# Patient Record
Sex: MALE | Race: WHITE | NOT HISPANIC OR LATINO | Employment: FULL TIME | URBAN - METROPOLITAN AREA
[De-identification: names, ages, dates, MRNs, and addresses within clinical notes are randomized per-mention and may not be internally consistent; named-entity substitution may affect disease eponyms.]

---

## 2019-04-10 ENCOUNTER — TELEPHONE (OUTPATIENT)
Dept: UROLOGY | Facility: MEDICAL CENTER | Age: 29
End: 2019-04-10

## 2019-04-16 ENCOUNTER — OFFICE VISIT (OUTPATIENT)
Dept: UROLOGY | Facility: CLINIC | Age: 29
End: 2019-04-16
Payer: COMMERCIAL

## 2019-04-16 VITALS
HEIGHT: 76 IN | DIASTOLIC BLOOD PRESSURE: 82 MMHG | SYSTOLIC BLOOD PRESSURE: 128 MMHG | WEIGHT: 226 LBS | HEART RATE: 67 BPM | BODY MASS INDEX: 27.52 KG/M2

## 2019-04-16 DIAGNOSIS — R33.9 INCOMPLETE BLADDER EMPTYING: Primary | ICD-10-CM

## 2019-04-16 LAB
POST-VOID RESIDUAL VOLUME, ML POC: 4 ML
SL AMB  POCT GLUCOSE, UA: NORMAL
SL AMB LEUKOCYTE ESTERASE,UA: NORMAL
SL AMB POCT BILIRUBIN,UA: NORMAL
SL AMB POCT BLOOD,UA: NORMAL
SL AMB POCT CLARITY,UA: CLEAR
SL AMB POCT COLOR,UA: YELLOW
SL AMB POCT KETONES,UA: NORMAL
SL AMB POCT NITRITE,UA: NORMAL
SL AMB POCT PH,UA: 5.5
SL AMB POCT SPECIFIC GRAVITY,UA: 1.01
SL AMB POCT URINE PROTEIN: NORMAL
SL AMB POCT UROBILINOGEN: NORMAL

## 2019-04-16 PROCEDURE — 99203 OFFICE O/P NEW LOW 30 MIN: CPT | Performed by: PHYSICIAN ASSISTANT

## 2019-04-16 PROCEDURE — 51798 US URINE CAPACITY MEASURE: CPT | Performed by: PHYSICIAN ASSISTANT

## 2019-04-16 PROCEDURE — 81002 URINALYSIS NONAUTO W/O SCOPE: CPT | Performed by: PHYSICIAN ASSISTANT

## 2019-05-07 ENCOUNTER — TELEPHONE (OUTPATIENT)
Dept: UROLOGY | Facility: CLINIC | Age: 29
End: 2019-05-07

## 2021-04-09 DIAGNOSIS — Z23 ENCOUNTER FOR IMMUNIZATION: ICD-10-CM

## 2021-04-10 ENCOUNTER — IMMUNIZATIONS (OUTPATIENT)
Dept: FAMILY MEDICINE CLINIC | Facility: HOSPITAL | Age: 31
End: 2021-04-10

## 2021-04-10 DIAGNOSIS — Z23 ENCOUNTER FOR IMMUNIZATION: Primary | ICD-10-CM

## 2021-04-10 PROCEDURE — 0001A SARS-COV-2 / COVID-19 MRNA VACCINE (PFIZER-BIONTECH) 30 MCG: CPT

## 2021-04-10 PROCEDURE — 91300 SARS-COV-2 / COVID-19 MRNA VACCINE (PFIZER-BIONTECH) 30 MCG: CPT

## 2021-05-01 ENCOUNTER — IMMUNIZATIONS (OUTPATIENT)
Dept: FAMILY MEDICINE CLINIC | Facility: HOSPITAL | Age: 31
End: 2021-05-01

## 2021-05-01 DIAGNOSIS — Z23 ENCOUNTER FOR IMMUNIZATION: Primary | ICD-10-CM

## 2021-05-01 PROCEDURE — 0002A SARS-COV-2 / COVID-19 MRNA VACCINE (PFIZER-BIONTECH) 30 MCG: CPT

## 2021-05-01 PROCEDURE — 91300 SARS-COV-2 / COVID-19 MRNA VACCINE (PFIZER-BIONTECH) 30 MCG: CPT

## 2021-11-02 ENCOUNTER — IMMUNIZATIONS (OUTPATIENT)
Dept: FAMILY MEDICINE CLINIC | Facility: HOSPITAL | Age: 31
End: 2021-11-02

## 2021-11-02 DIAGNOSIS — Z23 ENCOUNTER FOR IMMUNIZATION: Primary | ICD-10-CM

## 2021-11-02 PROCEDURE — 0001A COVID-19 PFIZER VACC 0.3 ML: CPT

## 2021-11-02 PROCEDURE — 91300 COVID-19 PFIZER VACC 0.3 ML: CPT

## 2024-02-26 ENCOUNTER — TELEPHONE (OUTPATIENT)
Dept: FAMILY MEDICINE CLINIC | Facility: CLINIC | Age: 34
End: 2024-02-26

## 2024-02-27 ENCOUNTER — OFFICE VISIT (OUTPATIENT)
Dept: FAMILY MEDICINE CLINIC | Facility: CLINIC | Age: 34
End: 2024-02-27
Payer: COMMERCIAL

## 2024-02-27 VITALS
OXYGEN SATURATION: 98 % | HEART RATE: 68 BPM | DIASTOLIC BLOOD PRESSURE: 86 MMHG | SYSTOLIC BLOOD PRESSURE: 120 MMHG | BODY MASS INDEX: 24.84 KG/M2 | TEMPERATURE: 97.8 F | WEIGHT: 204 LBS | HEIGHT: 76 IN

## 2024-02-27 DIAGNOSIS — G47.8 SLEEP PARALYSIS: ICD-10-CM

## 2024-02-27 DIAGNOSIS — F17.298 OTHER TOBACCO PRODUCT NICOTINE DEPENDENCE WITH OTHER NICOTINE-INDUCED DISORDER: Primary | ICD-10-CM

## 2024-02-27 DIAGNOSIS — L21.9 SEBORRHEIC DERMATITIS OF SCALP: ICD-10-CM

## 2024-02-27 PROCEDURE — 99205 OFFICE O/P NEW HI 60 MIN: CPT | Performed by: FAMILY MEDICINE

## 2024-02-27 RX ORDER — BUPROPION HYDROCHLORIDE 150 MG/1
150 TABLET, EXTENDED RELEASE ORAL 2 TIMES DAILY
Qty: 60 TABLET | Refills: 2 | Status: SHIPPED | OUTPATIENT
Start: 2024-02-27 | End: 2024-03-28

## 2024-02-27 RX ORDER — KETOCONAZOLE 20 MG/ML
1 SHAMPOO TOPICAL 2 TIMES WEEKLY
Qty: 120 ML | Refills: 6 | Status: SHIPPED | OUTPATIENT
Start: 2024-02-29

## 2024-02-27 RX ORDER — CLOBETASOL PROPIONATE 0.46 MG/ML
SOLUTION TOPICAL 2 TIMES DAILY
Qty: 50 ML | Refills: 2 | Status: SHIPPED | OUTPATIENT
Start: 2024-02-27

## 2024-02-27 NOTE — PROGRESS NOTES
Assessment/Plan:   1. Seborrheic dermatitis of scalp  Returns today.  Likely secondary to seborrheic dermatitis.  At this time, we will start treatment with clobetasol external solution as well as ketoconazole shampoo.  If needed, will consider further evaluation with dermatology.  - ketoconazole (NIZORAL) 2 % shampoo; Apply 1 Application topically 2 (two) times a week  Dispense: 120 mL; Refill: 6  - clobetasol (TEMOVATE) 0.05 % external solution; Apply topically 2 (two) times a day  Dispense: 50 mL; Refill: 2    2. Other tobacco product nicotine dependence with other nicotine-induced disorder  Reviewed patient's nicotine use.  At this time, we will start treatment with Wellbutrin  mg twice daily.  He was advised that this could also help with any anxiety symptoms that he may have.  - buPROPion (Wellbutrin SR) 150 mg 12 hr tablet; Take 1 tablet (150 mg total) by mouth 2 (two) times a day  Dispense: 60 tablet; Refill: 2    3. Sleep paralysis  Patient has had a long history of  sleep paralysis.  he has been using Benadryl for a long duration to help with his symptoms.  Will refer patient to sleep medicine for further evaluation.  Follow-up if any symptoms worsen.  - Ambulatory Referral to Sleep Medicine; Future           There are no diagnoses linked to this encounter.      Subjective:       Chief Complaint   Patient presents with    Establish Care     When under a lot of stress he has shaking that is visible    Skin Problem     Scalp --- like dandruff but worse    Nasal Congestion     Dependent on nasal spray      Patient ID: Paulino Krishna is a 33 y.o. male.    Rash  This is a chronic problem. The current episode started more than 1 year ago. The problem is unchanged. The affected locations include the scalp. The problem is mild. The rash is characterized by scaling. He was exposed to nothing. Associated symptoms include congestion. Pertinent negatives include no cough, diarrhea, fatigue, fever, shortness of  breath or sore throat. Past treatments include nothing.   Sinus Problem  This is a chronic problem. The current episode started more than 1 year ago. The problem is unchanged. There has been no fever. Associated symptoms include congestion and sinus pressure. Pertinent negatives include no chills, coughing, ear pain, headaches, shortness of breath or sore throat. Treatments tried: Mucinex.       Review of Systems   Constitutional:  Negative for activity change, chills, fatigue and fever.   HENT:  Positive for congestion and sinus pressure. Negative for ear pain and sore throat.    Eyes:  Negative for redness, itching and visual disturbance.   Respiratory:  Negative for cough and shortness of breath.    Cardiovascular:  Negative for chest pain and palpitations.   Gastrointestinal:  Negative for abdominal pain, diarrhea and nausea.   Endocrine: Negative for cold intolerance and heat intolerance.   Genitourinary:  Negative for dysuria, flank pain and frequency.   Musculoskeletal:  Negative for arthralgias, back pain, gait problem and myalgias.   Skin:  Positive for rash. Negative for color change.   Allergic/Immunologic: Negative for environmental allergies.   Neurological:  Negative for dizziness, numbness and headaches.   Psychiatric/Behavioral:  Negative for behavioral problems and sleep disturbance.        The following portions of the patient's history were reviewed and updated as appropriate : past family history, past medical history, past social history and past surgical history.    Current Outpatient Medications:     diphenhydrAMINE HCl (BENADRYL PO), Take by mouth daily as needed (Patient not taking: Reported on 2/27/2024), Disp: , Rfl:     Fexofenadine HCl (ALLEGRA PO), Take by mouth as needed (Patient not taking: Reported on 2/27/2024), Disp: , Rfl:     [facility] J[facility]NS WORT PO, Take by mouth daily (Patient not taking: Reported on 2/27/2024), Disp: , Rfl:          Objective:         Vitals:    02/27/24 0956   BP:  "120/86   BP Location: Left arm   Patient Position: Sitting   Cuff Size: Large   Pulse: 68   Temp: 97.8 °F (36.6 °C)   TempSrc: Temporal   SpO2: 98%   Weight: 92.5 kg (204 lb)   Height: 6' 4\" (1.93 m)     Physical Exam  Vitals reviewed.   Constitutional:       Appearance: He is well-developed.   HENT:      Head: Normocephalic and atraumatic.      Nose: Nose normal.      Mouth/Throat:      Pharynx: No oropharyngeal exudate.   Eyes:      General: No scleral icterus.        Right eye: No discharge.         Left eye: No discharge.      Pupils: Pupils are equal, round, and reactive to light.   Neck:      Trachea: No tracheal deviation.   Cardiovascular:      Rate and Rhythm: Normal rate and regular rhythm.      Pulses:           Dorsalis pedis pulses are 2+ on the right side and 2+ on the left side.        Posterior tibial pulses are 2+ on the right side and 2+ on the left side.      Heart sounds: Normal heart sounds. No murmur heard.     No friction rub. No gallop.   Pulmonary:      Effort: Pulmonary effort is normal. No respiratory distress.      Breath sounds: Normal breath sounds. No wheezing or rales.   Abdominal:      General: Bowel sounds are normal. There is no distension.      Palpations: Abdomen is soft.      Tenderness: There is no abdominal tenderness. There is no guarding or rebound.   Musculoskeletal:         General: Normal range of motion.      Cervical back: Normal range of motion and neck supple.   Lymphadenopathy:      Head:      Right side of head: No submental or submandibular adenopathy.      Left side of head: No submental or submandibular adenopathy.      Cervical: No cervical adenopathy.      Right cervical: No superficial, deep or posterior cervical adenopathy.     Left cervical: No superficial, deep or posterior cervical adenopathy.   Skin:     General: Skin is warm and dry.      Findings: No erythema.   Neurological:      Mental Status: He is alert and oriented to person, place, and time.      " Cranial Nerves: No cranial nerve deficit.      Sensory: No sensory deficit.   Psychiatric:         Mood and Affect: Mood is not anxious or depressed.         Speech: Speech normal.         Behavior: Behavior normal.         Thought Content: Thought content normal.         Judgment: Judgment normal.

## 2024-04-01 ENCOUNTER — TELEPHONE (OUTPATIENT)
Dept: NEUROLOGY | Facility: CLINIC | Age: 34
End: 2024-04-01

## 2024-04-03 ENCOUNTER — TELEPHONE (OUTPATIENT)
Age: 34
End: 2024-04-03

## 2024-04-03 NOTE — TELEPHONE ENCOUNTER
Carol from [facility] neurology called and stated that they need an insurance referral for [facility]. Please upload in Epic and they will pull the referral form that.    Date of patients appt - 4/11/2024  Codes - G47.8  Insurance referral  Place requesting [facility] neurology  NPI #[id number]      Thank you

## 2024-04-11 ENCOUNTER — TELEPHONE (OUTPATIENT)
Age: 34
End: 2024-04-11

## 2024-04-11 NOTE — TELEPHONE ENCOUNTER
Patient reports he is having some sleep issues while on the Wellbutrin. He is asking for the ER one to help him with his insomnia and sleeping issues.   - insomnia  - night sweats  - breathing heavy while asleep

## 2024-04-12 NOTE — TELEPHONE ENCOUNTER
Pt returned call, scheduled first available, 4/22 but hoped to be seen sooner. If we can accommodate, please reach out to patient.  Thank you.

## 2024-04-12 NOTE — TELEPHONE ENCOUNTER
LVM with Dr. Castellanos's recommendation and asked that pt call the office to schedule a provider requested f/u to discuss a medication change.

## 2024-04-12 NOTE — TELEPHONE ENCOUNTER
Spoke with patient and scheduled him for a sooner appointment with Dr. Tuttle on 4/15/24 at 4:45pm.

## 2024-04-15 ENCOUNTER — OFFICE VISIT (OUTPATIENT)
Dept: FAMILY MEDICINE CLINIC | Facility: CLINIC | Age: 34
End: 2024-04-15
Payer: COMMERCIAL

## 2024-04-15 VITALS
RESPIRATION RATE: 18 BRPM | BODY MASS INDEX: 25.93 KG/M2 | HEART RATE: 78 BPM | OXYGEN SATURATION: 98 % | TEMPERATURE: 98.2 F | SYSTOLIC BLOOD PRESSURE: 130 MMHG | WEIGHT: 213 LBS | DIASTOLIC BLOOD PRESSURE: 78 MMHG

## 2024-04-15 DIAGNOSIS — F17.290 OTHER TOBACCO PRODUCT NICOTINE DEPENDENCE, UNCOMPLICATED: Primary | ICD-10-CM

## 2024-04-15 DIAGNOSIS — L21.9 SEBORRHEIC DERMATITIS OF SCALP: ICD-10-CM

## 2024-04-15 PROCEDURE — 99213 OFFICE O/P EST LOW 20 MIN: CPT | Performed by: FAMILY MEDICINE

## 2024-04-15 RX ORDER — BUPROPION HYDROCHLORIDE 150 MG/1
150 TABLET ORAL EVERY MORNING
Qty: 30 TABLET | Refills: 0 | Status: SHIPPED | OUTPATIENT
Start: 2024-04-15 | End: 2024-05-28 | Stop reason: SDUPTHER

## 2024-04-15 NOTE — PROGRESS NOTES
Name: Paulino Krishna      : 1990      MRN: [id number]  Encounter Provider: Arleen Tuttle DO  Encounter Date: 4/15/2024   Encounter department: [facility]    Assessment & Plan     1. Other tobacco product nicotine dependence, uncomplicated  2. Seborrheic dermatitis of scalp  -     Ambulatory Referral to Dermatology; Future   Continue Buprpion for tobacco dependence prescribed by Dr. Castellanos. Referred to dermatology for scalp dermatitis.    Subjective      Chief Complaint   Patient presents with   • Follow-up     Discuss med change       Patient was prescribed wellbutrin for stop smoking.  He did notice that he felt he was a little more together.  He did try just taking one but is still smoking.  He teaches at [employer]  Still with scalp dermatitis.      Review of Systems   Skin:  Positive for rash.       Current Outpatient Medications on File Prior to Visit   Medication Sig   • clobetasol (TEMOVATE) 0.05 % external solution Apply topically 2 (two) times a day   • ketoconazole (NIZORAL) 2 % shampoo Apply 1 Application topically 2 (two) times a week       Objective     /78 (BP Location: Left arm, Patient Position: Sitting, Cuff Size: Standard)   Pulse 78   Temp 98.2 °F (36.8 °C) (Temporal)   Resp 18   Wt 96.6 kg (213 lb)   SpO2 98%   BMI 25.93 kg/m²     Physical Exam  Vitals and nursing note reviewed.   Constitutional:       General: He is not in acute distress.  Neck:      Thyroid: No thyromegaly.   Cardiovascular:      Rate and Rhythm: Normal rate and regular rhythm.      Heart sounds: Normal heart sounds.   Pulmonary:      Effort: Pulmonary effort is normal.      Breath sounds: Normal breath sounds.   Musculoskeletal:      Right lower leg: No edema.      Left lower leg: No edema.   Lymphadenopathy:      Cervical: No cervical adenopathy.   Skin:     General: Skin is warm and dry.   Neurological:      Mental Status: He is alert and oriented to person, place, and time.    Psychiatric:         Mood and Affect: Mood normal.       Arleen Tuttle, DO

## 2024-04-29 ENCOUNTER — TELEPHONE (OUTPATIENT)
Dept: FAMILY MEDICINE CLINIC | Facility: CLINIC | Age: 34
End: 2024-04-29

## 2024-05-28 ENCOUNTER — TELEPHONE (OUTPATIENT)
Age: 34
End: 2024-05-28

## 2024-05-28 DIAGNOSIS — F17.290 OTHER TOBACCO PRODUCT NICOTINE DEPENDENCE, UNCOMPLICATED: ICD-10-CM

## 2024-05-28 RX ORDER — BUPROPION HYDROCHLORIDE 150 MG/1
150 TABLET ORAL EVERY MORNING
Qty: 30 TABLET | Refills: 0 | Status: SHIPPED | OUTPATIENT
Start: 2024-05-28 | End: 2024-05-29

## 2024-05-28 NOTE — TELEPHONE ENCOUNTER
Patient requesting a new script for the medication below:    Medication: buPROPion (WELLBUTRIN XL) 150 mg 12 hr tablet    Dose/Frequency: Take 1 tablet (150 mg total) by mouth 2 (two) times a day     Quantity: 60 tablet     Pharmacy:   [id number]   [address] [address]  Phone: [phone]    Fax: [phone]    Office:   [x] PCP/Provider - Dr. Jet Castellanos,   [] Speciality/Provider -     Does the patient have enough for 3 days?   [] Yes   [x] No - Send as HP to POD

## 2024-05-29 DIAGNOSIS — F17.298 OTHER TOBACCO PRODUCT NICOTINE DEPENDENCE WITH OTHER NICOTINE-INDUCED DISORDER: ICD-10-CM

## 2024-05-29 RX ORDER — BUPROPION HYDROCHLORIDE 150 MG/1
150 TABLET, EXTENDED RELEASE ORAL 2 TIMES DAILY
Qty: 60 TABLET | Refills: 2 | Status: SHIPPED | OUTPATIENT
Start: 2024-05-29 | End: 2024-06-28

## 2024-06-11 ENCOUNTER — TELEPHONE (OUTPATIENT)
Age: 34
End: 2024-06-11

## 2024-06-11 NOTE — TELEPHONE ENCOUNTER
Spoke with Elis @ [facility] regarding insurance issue. Pt established with us 2/27/24 , insurance still under [facility] [place] and then again in April still under [facility] became effective with us 6/1/24. Elis asked for a letter just explaining this and fax to her.

## 2024-06-11 NOTE — TELEPHONE ENCOUNTER
Patients insurance called.  They have Dr. Castellanos as working with [facility].  They are denying a claim on 2.27.24 because they do not have this patient showing as Dr. Castellanos with [facility] and as this patient PCP. They requested an ins referral.  We need to fax a letter stating that Dr. Castellanos is with [facility] and is this patients PCP to process claim.  Please fax letter to  Attn: Emmanuel

## 2024-06-21 ENCOUNTER — OFFICE VISIT (OUTPATIENT)
Age: 34
End: 2024-06-21
Payer: COMMERCIAL

## 2024-06-21 VITALS
HEIGHT: 76 IN | HEART RATE: 70 BPM | OXYGEN SATURATION: 98 % | SYSTOLIC BLOOD PRESSURE: 124 MMHG | WEIGHT: 229 LBS | BODY MASS INDEX: 27.89 KG/M2 | DIASTOLIC BLOOD PRESSURE: 70 MMHG

## 2024-06-21 DIAGNOSIS — G47.8 SLEEP PARALYSIS: ICD-10-CM

## 2024-06-21 DIAGNOSIS — F51.04 CHRONIC INSOMNIA: ICD-10-CM

## 2024-06-21 DIAGNOSIS — G47.19 EXCESSIVE DAYTIME SLEEPINESS: Primary | ICD-10-CM

## 2024-06-21 DIAGNOSIS — R06.83 SNORING: ICD-10-CM

## 2024-06-21 PROCEDURE — 99204 OFFICE O/P NEW MOD 45 MIN: CPT | Performed by: INTERNAL MEDICINE

## 2024-06-21 NOTE — PROGRESS NOTES
Sleep Consultation   Paulino Krishna 34 y.o. male MRN: [id number]      Reason for consultation: Sleep paralysis    Requesting physician: Jet Castellanos DO PCP    Assessment/Plan    Suspected sleep apnea  Mallampati class 4, Body mass index is 27.87 kg/m²., Neck Circumference: 17.    He/she is at risk for obstructive sleep apnea based on ST[employer]ANG survey based on snoring, tiredness, male gender  S/s: Snoring, choking, tiredness, daytime sleepiness  [place] score: 6  I discussed in depth the diagnostic studies and treatment options involved with obstructive sleep apnea  I also discussed in depth the risk of leaving sleep apnea untreated including hypertension, heart failure, arrhythmia, MI and stroke.  The patient is agreeable to undergo testing and treatment of obstructive sleep apnea.  He/she understands the pitfalls he/she may encounter along the way and is willing to attempt CPAP treatment.     Plan  Ordered home sleep study  Patient is amenable to CPAP    2.  Snoring  Reports loud snoring  I will monitor for improvement with treatment    3.  Excessive daytime sleepiness  [place] score of 6, despite this he reports significant daytime sleepiness  I will monitor for improvement with treatment    4.  Chronic insomnia  Sleep onset and maintenance insomnia  Counseled patient on sleep hygiene improvement  Recommended he decrease caffeine and    Plan  Provided referral to cognitive behavioral therapy for insomnia    5.  Isolated sleep paralysis  Reports sleep hallucinations  Denies cataplexy  Unclear if this is related to narcolepsy  He states it has not occurred recently  Continue to monitor    History of Present Illness   HPI:  Paulino Krishna is a 34 y.o. male with PMHx Depression/anxiety who presents for evaluation of suspected sleep apnea.  He reports loud snoring and choking during sleep.  He has excessive daytime sleepiness with an [place] score of 6.  He has difficulty falling and staying asleep.  He goes to bed at 1-2  AM.  He wakes up at 9:30 AM.  He gets 6 hours of sleep on average.  He has 1-2 nighttime awakenings.  He needs multiple alarms to wake up.  He drinks coffee in the morning.  He reports kicking his legs in his sleep.  He has a history of sleep paralysis since the age of 14.  He occasionally has sleep hallucinations.  He has frequent nightmares.  He  feels his concentration has decreased.  He has heartburn during sleeping.  He has a history of TMJ.    He drinks 3 large cups of coffee daily.  He smokes cigarettes.    He has a family history of schizophrenia in his father side.    Auditory and visual hallucinations when falling asleep and waking up, sees things in the room that look like something else. Saw insects.   Denies cataplexy.     Depression and anxiety especially in early 20's but has improved.     He takes welbutrin to help quit smoking. He has some insomnia related to welbutrin. Previously took zoloft.     Previously diagnosed with ADHD as a child. He has stopped taking adderall.         Review of Systems      Genitourinary none   Cardiology none   Gastrointestinal none   Neurology none   Constitutional none   Integumentary none   Psychiatry none   Musculoskeletal none   Pulmonary none   ENT none   Endocrine none   Hematological none         Historical Information   History reviewed. No pertinent past medical history.  History reviewed. No pertinent surgical history.  Family History   Problem Relation Age of Onset    No Known Problems Mother     No Known Problems Father      Social History     Socioeconomic History    Marital status: Single     Spouse name: Not on file    Number of children: Not on file    Years of education: Not on file    Highest education level: Not on file   Occupational History    Not on file   Tobacco Use    Smoking status: Never    Smokeless tobacco: Never   Vaping Use    Vaping status: Every Day   Substance and Sexual Activity    Alcohol use: Yes     Alcohol/week: 1.0 standard drink  "of alcohol     Types: 1 Cans of beer per week    Drug use: Not Currently    Sexual activity: Not on file   Other Topics Concern    Not on file   Social History Narrative    Not on file     Social Determinants of Health     Financial Resource Strain: Not on file   Food Insecurity: Not on file   Transportation Needs: Not on file   Physical Activity: Not on file   Stress: Not on file   Social Connections: Not on file   Intimate Partner Violence: Not on file   Housing Stability: Not on file       Occupational History: Works as  at [employer]/Allergies   No Known Allergies    Home medications:  Prior to Admission medications    Medication Sig Start Date End Date Taking? Authorizing Provider   buPROPion (Wellbutrin SR) 150 mg 12 hr tablet Take 1 tablet (150 mg total) by mouth 2 (two) times a day 5/29/24 6/28/24 Yes Jet Castellanos,    clobetasol (TEMOVATE) 0.05 % external solution Apply topically 2 (two) times a day 2/27/24  Yes Jet Castellanos DO   ketoconazole (NIZORAL) 2 % shampoo Apply 1 Application topically 2 (two) times a week 2/29/24  Yes Jet Castellanos DO       Vitals:   Blood pressure 124/70, pulse 70, height 6' 4\" (1.93 m), weight 104 kg (229 lb), SpO2 98%.,  Body mass index is 27.87 kg/m².  Neck Circumference: 17    Physical Exam  General: Awake alert and oriented x 3, conversant without conversational dyspnea, NAD, normal affect  HEENT:  PERRL, Sclera noninjected, nonicteric OU, Nares patent,  no craniofacial abnormalities, Mucous membranes, moist, no oral lesions, normal dentition, Mallampati class 4  NECK:  Trachea midline, no accessory muscle use, no stridor, no cervical or supraclavicular adenopathy, JVP not elevated  CARDIAC: Reg, single s1/S2, no m/r/g  PULM: CTA bilaterally no wheezing, rhonchi or rales  EXT: No cyanosis, no clubbing, no edema, normal capillary refill  NEURO: no focal neurologic deficits, AAOx3, moving all extremities appropriately    Labs: I have personally " "reviewed pertinent lab results.  No results found for: \"WBC\", \"HGB\", \"HCT\", \"MCV\", \"PLT\"   No results found for: \"GLUCOSE\", \"CALCIUM\", \"NA\", \"K\", \"CO2\", \"CL\", \"BUN\", \"CREATININE\"  No results found for: \"IRON\", \"TIBC\", \"FERRITIN\"  No results found for: \"IAWXCVDI02\"  No results found for: \"FOLATE\"      Arterial Blood Gas result:  KINGSTON Mcmahon MD  [facility]   "

## 2024-07-08 ENCOUNTER — TELEPHONE (OUTPATIENT)
Dept: PSYCHIATRY | Facility: CLINIC | Age: 34
End: 2024-07-08

## 2024-07-08 NOTE — TELEPHONE ENCOUNTER
Contacted patient in regards to Routine Referral in attempts to verify patient's needs of services and add patient to proper wait list. Writer verified , address, callback number, and insurance. Writer spoke with patient whom stated interested in services. Pt was stating that the Wellbutrin was impacting his ability to sleep and has been a quiroz for him.  Wellbutrin had some positive affects despite his sleeping habits. Pt stated that he has a sleep doctor who he works with, but needs further assistance with his medication and a therapist to assist with his current struggles.    Preferences  [facility] Med Mgmt, [place], Female Prov Pref  [facility], [place], Female Prov Pref    Email: [email or web address]    Final call attempt, closed, Referral Completed

## 2024-07-12 ENCOUNTER — TELEPHONE (OUTPATIENT)
Dept: FAMILY MEDICINE CLINIC | Facility: CLINIC | Age: 34
End: 2024-07-12

## 2024-07-12 NOTE — TELEPHONE ENCOUNTER
Lvm notifying pt that their appointment on 7/26 is cancelled due to a change in provider's schedule. Asked pt to call back to reschedule physical

## 2024-08-07 ENCOUNTER — HOSPITAL ENCOUNTER (OUTPATIENT)
Dept: SLEEP CENTER | Facility: CLINIC | Age: 34
Discharge: HOME | End: 2024-08-07
Payer: COMMERCIAL

## 2024-08-07 DIAGNOSIS — G47.19 EXCESSIVE DAYTIME SLEEPINESS: ICD-10-CM

## 2024-08-07 PROCEDURE — G0399 HOME SLEEP TEST/TYPE 3 PORTA: HCPCS

## 2024-08-07 NOTE — PROGRESS NOTES
Home Sleep Study Documentation    HOME STUDY DEVICE: Noxturnal no                                           Mckenzie G3 yes device # 17      Pre-Sleep Home Study:    Set-up and instructions performed by: Gladis    Technician performed demonstration for Patient: yes    Return demonstration performed by Patient: yes    Written instructions provided to Patient: yes    Patient signed consent form: yes        Post-Sleep Home Study:    Additional comments by Patient:       Home Sleep Study Failed:no:    Failure reason: N/A    Reported or Detected: N/A    Scored by: WM Brooks

## 2024-08-09 PROBLEM — G47.33 OSA (OBSTRUCTIVE SLEEP APNEA): Status: ACTIVE | Noted: 2024-08-09

## 2024-08-12 DIAGNOSIS — G47.19 EXCESSIVE DAYTIME SLEEPINESS: Primary | ICD-10-CM

## 2024-08-12 PROCEDURE — G0399 HOME SLEEP TEST/TYPE 3 PORTA: HCPCS | Performed by: INTERNAL MEDICINE

## 2024-08-12 NOTE — PROGRESS NOTES
As there is high clinical suspicion and patient slept poorly during home study, ordered in lab diagnostic polysomnogram

## 2024-08-22 DIAGNOSIS — F17.298 OTHER TOBACCO PRODUCT NICOTINE DEPENDENCE WITH OTHER NICOTINE-INDUCED DISORDER: ICD-10-CM

## 2024-08-22 DIAGNOSIS — L21.9 SEBORRHEIC DERMATITIS OF SCALP: ICD-10-CM

## 2024-08-22 RX ORDER — BUPROPION HYDROCHLORIDE 150 MG/1
150 TABLET, EXTENDED RELEASE ORAL 2 TIMES DAILY
Qty: 60 TABLET | Refills: 5 | Status: SHIPPED | OUTPATIENT
Start: 2024-08-22 | End: 2024-09-21

## 2024-08-22 RX ORDER — KETOCONAZOLE 20 MG/ML
1 SHAMPOO TOPICAL 2 TIMES WEEKLY
Qty: 120 ML | Refills: 0 | Status: SHIPPED | OUTPATIENT
Start: 2024-08-22

## 2024-09-01 ENCOUNTER — TELEPHONE (OUTPATIENT)
Dept: SLEEP CENTER | Facility: CLINIC | Age: 34
End: 2024-09-01

## 2024-09-01 NOTE — TELEPHONE ENCOUNTER
Sleep study resulted and does not show sleep apnea.  Diagnostic in lab study ordered.     Patient read results of study and scheduled Diagnostic sleep study via [employer] for 2/13/2025. Patient on wait list.

## 2024-09-03 ENCOUNTER — TELEPHONE (OUTPATIENT)
Age: 34
End: 2024-09-03

## 2024-09-03 DIAGNOSIS — G47.8 SLEEP PARALYSIS: Primary | ICD-10-CM

## 2024-09-03 NOTE — TELEPHONE ENCOUNTER
Carol from [facility] called requesting a new order/referral for an office visit for the appointment on 9/11/2024 at 3:50 pm. She stated the referral on 8/12/2024 is for a study but she needs one for an office visit.

## 2024-09-03 NOTE — TELEPHONE ENCOUNTER
I corrected the insurance plan for the patient to the correct plan    Patient is requesting an insurance referral for the following specialty:      Test Name / Order Name: Office Visit    DX Code: G47.19 (ICD-10-CM) - Excessive daytime sleepiness    Date Of Service: 9/11/2024     Location/Facility Name/Address/Phone #:    Dr. Renny Mcmahon   [facility] [place],   [address] [place]  [address]    Phone: [phone]     Location / Facility NPI: [id number]    [facility] Phone # To Reach The Patient: [phone]

## 2024-09-06 ENCOUNTER — TELEPHONE (OUTPATIENT)
Age: 34
End: 2024-09-06

## 2024-09-10 ENCOUNTER — TELEPHONE (OUTPATIENT)
Dept: NEUROLOGY | Facility: CLINIC | Age: 34
End: 2024-09-10

## 2024-09-10 NOTE — TELEPHONE ENCOUNTER
Called patient and let him know that unfortunately we will have to cancel his appt for tomorrow as he needs to complete his diagnostic sleep study before scheduling a follow up. Requested that the patient call us back to have his appt rescheduled

## 2024-09-12 ENCOUNTER — OFFICE VISIT (OUTPATIENT)
Dept: FAMILY MEDICINE CLINIC | Facility: CLINIC | Age: 34
End: 2024-09-12
Payer: COMMERCIAL

## 2024-09-12 ENCOUNTER — TELEPHONE (OUTPATIENT)
Dept: FAMILY MEDICINE CLINIC | Facility: CLINIC | Age: 34
End: 2024-09-12

## 2024-09-12 VITALS
HEART RATE: 67 BPM | SYSTOLIC BLOOD PRESSURE: 124 MMHG | HEIGHT: 76 IN | DIASTOLIC BLOOD PRESSURE: 84 MMHG | TEMPERATURE: 98.3 F | OXYGEN SATURATION: 97 % | BODY MASS INDEX: 27.69 KG/M2 | WEIGHT: 227.4 LBS

## 2024-09-12 DIAGNOSIS — Z13.1 SCREENING FOR DIABETES MELLITUS: ICD-10-CM

## 2024-09-12 DIAGNOSIS — Z13.220 SCREENING FOR CHOLESTEROL LEVEL: ICD-10-CM

## 2024-09-12 DIAGNOSIS — Z00.00 ANNUAL PHYSICAL EXAM: Primary | ICD-10-CM

## 2024-09-12 DIAGNOSIS — Z13.29 SCREENING FOR THYROID DISORDER: ICD-10-CM

## 2024-09-12 DIAGNOSIS — F90.9 ATTENTION DEFICIT HYPERACTIVITY DISORDER (ADHD), UNSPECIFIED ADHD TYPE: ICD-10-CM

## 2024-09-12 DIAGNOSIS — J31.0 CHRONIC RHINITIS: ICD-10-CM

## 2024-09-12 DIAGNOSIS — M21.612 BUNION, LEFT: ICD-10-CM

## 2024-09-12 DIAGNOSIS — Z13.0 SCREENING FOR IRON DEFICIENCY ANEMIA: Primary | ICD-10-CM

## 2024-09-12 PROCEDURE — 99214 OFFICE O/P EST MOD 30 MIN: CPT | Performed by: FAMILY MEDICINE

## 2024-09-12 PROCEDURE — 99395 PREV VISIT EST AGE 18-39: CPT | Performed by: FAMILY MEDICINE

## 2024-09-12 RX ORDER — AZELASTINE 1 MG/ML
1 SPRAY, METERED NASAL 2 TIMES DAILY
Qty: 30 ML | Refills: 5 | Status: SHIPPED | OUTPATIENT
Start: 2024-09-12 | End: 2024-10-12

## 2024-09-12 NOTE — PATIENT INSTRUCTIONS
"Patient Education     Routine physical for adults   The Basics   Written by the doctors and editors at [facility]   What is a physical? -- A physical is a routine visit, or \"check-up,\" with your doctor. You might also hear it called a \"wellness visit\" or \"preventive visit.\"  During each visit, the doctor will:   Ask about your physical and mental health   Ask about your habits, behaviors, and lifestyle   Do an exam   Give you vaccines if needed   Talk to you about any medicines you take   Give advice about your health   Answer your questions  Getting regular check-ups is an important part of taking care of your health. It can help your doctor find and treat any problems you have. But it's also important for preventing health problems.  A routine physical is different from a \"sick visit.\" A sick visit is when you see a doctor because of a health concern or problem. Since physicals are scheduled ahead of time, you can think about what you want to ask the doctor.  How often should I get a physical? -- It depends on your age and health. In general, for people age 21 years and older:   If you are younger than 50 years, you might be able to get a physical every 3 years.   If you are 50 years or older, your doctor might recommend a physical every year.  If you have an ongoing health condition, like diabetes or high blood pressure, your doctor will probably want to see you more often.  What happens during a physical? -- In general, each visit will include:   Physical exam - The doctor or nurse will check your height, weight, heart rate, and blood pressure. They will also look at your eyes and ears. They will ask about how you are feeling and whether you have any symptoms that bother you.   Medicines - It's a good idea to bring a list of all the medicines you take to each doctor visit. Your doctor will talk to you about your medicines and answer any questions. Tell them if you are having any side effects that bother you. You " "should also tell them if you are having trouble paying for any of your medicines.   Habits and behaviors - This includes:   Your diet   Your exercise habits   Whether you smoke, drink alcohol, or use drugs   Whether you are sexually active   Whether you feel safe at home  Your doctor will talk to you about things you can do to improve your health and lower your risk of health problems. They will also offer help and support. For example, if you want to quit smoking, they can give you advice and might prescribe medicines. If you want to improve your diet or get more physical activity, they can help you with this, too.   Lab tests, if needed - The tests you get will depend on your age and situation. For example, your doctor might want to check your:   Cholesterol   Blood sugar   Iron level   Vaccines - The recommended vaccines will depend on your age, health, and what vaccines you already had. Vaccines are very important because they can prevent certain serious or deadly infections.   Discussion of screening - \"Screening\" means checking for diseases or other health problems before they cause symptoms. Your doctor can recommend screening based on your age, risk, and preferences. This might include tests to check for:   Cancer, such as breast, prostate, cervical, ovarian, colorectal, prostate, lung, or skin cancer   Sexually transmitted infections, such as chlamydia and gonorrhea   Mental health conditions like depression and anxiety  Your doctor will talk to you about the different types of screening tests. They can help you decide which screenings to have. They can also explain what the results might mean.   Answering questions - The physical is a good time to ask the doctor or nurse questions about your health. If needed, they can refer you to other doctors or specialists, too.  Adults older than 65 years often need other care, too. As you get older, your doctor will talk to you about:   How to prevent falling at " home   Hearing or vision tests   Memory testing   How to take your medicines safely   Making sure that you have the help and support you need at home  All topics are updated as new evidence becomes available and our peer review process is complete.  This topic retrieved from [employer] on: May 02, 2024.  Topic 908971 Version 1.0  Release: 32.4.3 - C32.122  © 2024 UpToDate, Inc. and/or its affiliates. All rights reserved.  Consumer Information Use and Disclaimer   Disclaimer: This generalized information is a limited summary of diagnosis, treatment, and/or medication information. It is not meant to be comprehensive and should be used as a tool to help the user understand and/or assess potential diagnostic and treatment options. It does NOT include all information about conditions, treatments, medications, side effects, or risks that may apply to a specific patient. It is not intended to be medical advice or a substitute for the medical advice, diagnosis, or treatment of a health care provider based on the health care provider's examination and assessment of a patient's specific and unique circumstances. Patients must speak with a health care provider for complete information about their health, medical questions, and treatment options, including any risks or benefits regarding use of medications. This information does not endorse any treatments or medications as safe, effective, or approved for treating a specific patient. UpToDate, Inc. and its affiliates disclaim any warranty or liability relating to this information or the use thereof.The use of this information is governed by the Terms of Use, available at https://www.[employer].com/en/know/clinical-effectiveness-terms. 2024© UpToDate, Inc. and its affiliates and/or licensors. All rights reserved.  Copyright   © 2024 UpToDate, Inc. and/or its affiliates. All rights reserved.

## 2024-09-12 NOTE — PROGRESS NOTES
Assessment/Plan:   1. Attention deficit hyperactivity disorder (ADHD), unspecified ADHD type  Reviewed patient's symptoms.  He states that he was diagnosed with ADHD many years ago.  He states the Wellbutrin SR has been effective for him.  He has tried the XL medication however this was not effective for him.  Will continue with his current treatment Wellbutrin SR once daily.  Follow-up if any symptoms worsen.    2. Chronic rhinitis  Stable.  At some, will start treat with azelastine.  He may benefit from a sinus rinse/saline nasal spray regularly.  - azelastine (ASTELIN) 0.1 % nasal spray; 1 spray into each nostril 2 (two) times a day Use in each nostril as directed  Dispense: 30 mL; Refill: 5    3. Bunion, left  Patient found to have a left bunion.  At this time, he may potentially have signs of metatarsalgia.  Will refer patient to podiatry for evaluation.  - Ambulatory Referral to Podiatry; Future               Diagnoses and all orders for this visit:    Annual physical exam    Attention deficit hyperactivity disorder (ADHD), unspecified ADHD type    Chronic rhinitis  -     azelastine (ASTELIN) 0.1 % nasal spray; 1 spray into each nostril 2 (two) times a day Use in each nostril as directed    Bunion, left  -     Ambulatory Referral to Podiatry; Future    BMI 27.0-27.9,adult          Subjective:       Chief Complaint   Patient presents with    Physical Exam      Patient ID: Paulino Krishna is a 34 y.o. male presents today for his physical exam.  He does have multiple complaints today.  Since his last visit, he has been taking Wellbutrin which he has been using to help him quit smoking however he has noticed that this medication has a moderate benefit for his focus /concentration.  He also has been having present problems with chronic runny nose/congestion.  He has been on Afrin for many years.  He does notice dry blood periodically.  He lastly has complaints today of great toe/forefoot pain.  He has had this for the  past year.  Symptoms started gradually.  He denies any trauma.  He states that he has noticed a slight bunion in this area as well.    HPI    Review of Systems   Constitutional:  Negative for activity change, chills, fatigue and fever.   HENT:  Negative for congestion, ear pain, sinus pressure and sore throat.    Eyes:  Negative for redness, itching and visual disturbance.   Respiratory:  Negative for cough and shortness of breath.    Cardiovascular:  Negative for chest pain and palpitations.   Gastrointestinal:  Negative for abdominal pain, diarrhea and nausea.   Endocrine: Negative for cold intolerance and heat intolerance.   Genitourinary:  Negative for dysuria, flank pain and frequency.   Musculoskeletal:  Negative for arthralgias, back pain, gait problem and myalgias.   Skin:  Negative for color change.   Allergic/Immunologic: Negative for environmental allergies.   Neurological:  Negative for dizziness, numbness and headaches.   Psychiatric/Behavioral:  Negative for behavioral problems and sleep disturbance.        The following portions of the patient's history were reviewed and updated as appropriate : past family history, past medical history, past social history and past surgical history.    Current Outpatient Medications:     azelastine (ASTELIN) 0.1 % nasal spray, 1 spray into each nostril 2 (two) times a day Use in each nostril as directed, Disp: 30 mL, Rfl: 5    buPROPion (Wellbutrin SR) 150 mg 12 hr tablet, Take 1 tablet (150 mg total) by mouth 2 (two) times a day, Disp: 60 tablet, Rfl: 5    clobetasol (TEMOVATE) 0.05 % external solution, Apply topically 2 (two) times a day, Disp: 50 mL, Rfl: 2    ketoconazole (NIZORAL) 2 % shampoo, Apply 1 Application topically 2 (two) times a week, Disp: 120 mL, Rfl: 0         Objective:         Vitals:    09/12/24 1540   BP: 124/84   BP Location: Left arm   Patient Position: Sitting   Cuff Size: Large   Pulse: 67   Temp: 98.3 °F (36.8 °C)   TempSrc: Temporal  "  SpO2: 97%   Weight: 103 kg (227 lb 6.4 oz)   Height: 6' 4\" (1.93 m)     Physical Exam  Vitals reviewed.   Constitutional:       General: He is not in acute distress.     Appearance: Normal appearance. He is well-developed.   HENT:      Head: Normocephalic and atraumatic.      Right Ear: Tympanic membrane, ear canal and external ear normal. There is no impacted cerumen.      Left Ear: Tympanic membrane, ear canal and external ear normal. There is no impacted cerumen.      Nose: Nose normal. No congestion or rhinorrhea.      Mouth/Throat:      Mouth: Mucous membranes are moist.      Pharynx: No oropharyngeal exudate or posterior oropharyngeal erythema.   Eyes:      General: No scleral icterus.        Right eye: No discharge.         Left eye: No discharge.      Extraocular Movements: Extraocular movements intact.      Conjunctiva/sclera: Conjunctivae normal.      Pupils: Pupils are equal, round, and reactive to light.   Neck:      Trachea: No tracheal deviation.   Cardiovascular:      Rate and Rhythm: Normal rate and regular rhythm.      Pulses: Normal pulses.           Dorsalis pedis pulses are 2+ on the right side and 2+ on the left side.        Posterior tibial pulses are 2+ on the right side and 2+ on the left side.      Heart sounds: Normal heart sounds. No murmur heard.     No friction rub. No gallop.   Pulmonary:      Effort: Pulmonary effort is normal. No respiratory distress.      Breath sounds: Normal breath sounds. No wheezing, rhonchi or rales.   Abdominal:      General: Bowel sounds are normal. There is no distension.      Palpations: Abdomen is soft.      Tenderness: There is no abdominal tenderness. There is no guarding or rebound.   Musculoskeletal:         General: Normal range of motion.      Cervical back: Normal range of motion and neck supple.      Right lower leg: No edema.      Left lower leg: No edema.   Lymphadenopathy:      Head:      Right side of head: No submental or submandibular " adenopathy.      Left side of head: No submental or submandibular adenopathy.      Cervical: No cervical adenopathy.      Right cervical: No superficial, deep or posterior cervical adenopathy.     Left cervical: No superficial, deep or posterior cervical adenopathy.   Skin:     General: Skin is warm and dry.      Findings: No erythema.   Neurological:      General: No focal deficit present.      Mental Status: He is alert and oriented to person, place, and time.      Cranial Nerves: No cranial nerve deficit.      Sensory: Sensation is intact. No sensory deficit.      Motor: Motor function is intact.   Psychiatric:         Attention and Perception: Attention and perception normal.         Mood and Affect: Mood is not anxious or depressed.         Speech: Speech normal.         Behavior: Behavior normal.         Thought Content: Thought content normal.         Judgment: Judgment normal.

## 2024-09-12 NOTE — PROGRESS NOTES
Adult Annual Physical  Name: Paulino Krishna      : 1990      MRN: [id number]  Encounter Provider: Jet Castellanos DO  Encounter Date: 2024   Encounter department: [facility]    Assessment & Plan  Attention deficit hyperactivity disorder (ADHD), unspecified ADHD type         Chronic rhinitis    Orders:    azelastine (ASTELIN) 0.1 % nasal spray; 1 spray into each nostril 2 (two) times a day Use in each nostril as directed    Bunion, left    Orders:    Ambulatory Referral to Podiatry; Future    Annual physical exam         BMI 27.0-27.9,adult           Immunizations and preventive care screenings were discussed with patient today. Appropriate education was printed on patient's after visit summary.    Counseling:  Alcohol/drug use: discussed moderation in alcohol intake, the recommendations for healthy alcohol use, and avoidance of illicit drug use.  Dental Health: discussed importance of regular tooth brushing, flossing, and dental visits.  Injury prevention: discussed safety/seat belts, safety helmets, smoke detectors, carbon dioxide detectors, and smoking near bedding or upholstery.  Sexual health: discussed sexually transmitted diseases, partner selection, use of condoms, avoidance of unintended pregnancy, and contraceptive alternatives.  Exercise: the importance of regular exercise/physical activity was discussed. Recommend exercise 3-5 times per week for at least 30 minutes.          History of Present Illness     Adult Annual Physical:  Patient presents for annual physical.     Diet and Physical Activity:  - Diet/Nutrition: well balanced diet.  - Exercise: walking. Hiking    Depression Screening:  - PHQ-2 Score: 1    General Health:  - Sleep: sleeps poorly.  - Hearing: normal hearing bilateral ears.  - Vision: no vision problems.  - Dental: regular dental visits.     Health:  - History of STDs: no.   - Urinary symptoms: none.     Review of Systems   Constitutional:  Negative  "for activity change, chills, fatigue and fever.   HENT:  Negative for congestion, ear pain, sinus pressure and sore throat.    Eyes:  Negative for redness, itching and visual disturbance.   Respiratory:  Negative for cough and shortness of breath.    Cardiovascular:  Negative for chest pain and palpitations.   Gastrointestinal:  Negative for abdominal pain, diarrhea and nausea.   Endocrine: Negative for cold intolerance and heat intolerance.   Genitourinary:  Negative for dysuria, flank pain and frequency.   Musculoskeletal:  Negative for arthralgias, back pain, gait problem and myalgias.   Skin:  Negative for color change.   Allergic/Immunologic: Negative for environmental allergies.   Neurological:  Negative for dizziness, numbness and headaches.   Psychiatric/Behavioral:  Negative for behavioral problems and sleep disturbance.      Medical History Reviewed by provider this encounter:       Current Outpatient Medications on File Prior to Visit   Medication Sig Dispense Refill    buPROPion (Wellbutrin SR) 150 mg 12 hr tablet Take 1 tablet (150 mg total) by mouth 2 (two) times a day 60 tablet 5    clobetasol (TEMOVATE) 0.05 % external solution Apply topically 2 (two) times a day 50 mL 2    ketoconazole (NIZORAL) 2 % shampoo Apply 1 Application topically 2 (two) times a week 120 mL 0     No current facility-administered medications on file prior to visit.      Social History     Tobacco Use    Smoking status: Never    Smokeless tobacco: Never   Vaping Use    Vaping status: Every Day    Substances: Nicotine, Flavoring   Substance and Sexual Activity    Alcohol use: Yes     Alcohol/week: 1.0 standard drink of alcohol     Types: 1 Cans of beer per week    Drug use: Not Currently    Sexual activity: Not on file       Objective     /84 (BP Location: Left arm, Patient Position: Sitting, Cuff Size: Large)   Pulse 67   Temp 98.3 °F (36.8 °C) (Temporal)   Ht 6' 4\" (1.93 m)   Wt 103 kg (227 lb 6.4 oz)   SpO2 97%   " BMI 27.68 kg/m²     Physical Exam  Vitals reviewed.   Constitutional:       General: He is not in acute distress.     Appearance: Normal appearance. He is well-developed.   HENT:      Head: Normocephalic and atraumatic.      Right Ear: Tympanic membrane, ear canal and external ear normal. There is no impacted cerumen.      Left Ear: Tympanic membrane, ear canal and external ear normal. There is no impacted cerumen.      Nose: Nose normal. No congestion or rhinorrhea.      Mouth/Throat:      Mouth: Mucous membranes are moist.      Pharynx: No oropharyngeal exudate or posterior oropharyngeal erythema.   Eyes:      General: No scleral icterus.        Right eye: No discharge.         Left eye: No discharge.      Extraocular Movements: Extraocular movements intact.      Conjunctiva/sclera: Conjunctivae normal.      Pupils: Pupils are equal, round, and reactive to light.   Neck:      Trachea: No tracheal deviation.   Cardiovascular:      Rate and Rhythm: Normal rate and regular rhythm.      Pulses: Normal pulses.           Dorsalis pedis pulses are 2+ on the right side and 2+ on the left side.        Posterior tibial pulses are 2+ on the right side and 2+ on the left side.      Heart sounds: Normal heart sounds. No murmur heard.     No friction rub. No gallop.   Pulmonary:      Effort: Pulmonary effort is normal. No respiratory distress.      Breath sounds: Normal breath sounds. No wheezing, rhonchi or rales.   Abdominal:      General: Bowel sounds are normal. There is no distension.      Palpations: Abdomen is soft.      Tenderness: There is no abdominal tenderness. There is no guarding or rebound.   Musculoskeletal:         General: Normal range of motion.      Cervical back: Normal range of motion and neck supple.      Right lower leg: No edema.      Left lower leg: No edema.   Lymphadenopathy:      Head:      Right side of head: No submental or submandibular adenopathy.      Left side of head: No submental or  submandibular adenopathy.      Cervical: No cervical adenopathy.      Right cervical: No superficial, deep or posterior cervical adenopathy.     Left cervical: No superficial, deep or posterior cervical adenopathy.   Skin:     General: Skin is warm and dry.      Findings: No erythema.   Neurological:      General: No focal deficit present.      Mental Status: He is alert and oriented to person, place, and time.      Cranial Nerves: No cranial nerve deficit.      Sensory: Sensation is intact. No sensory deficit.      Motor: Motor function is intact.   Psychiatric:         Attention and Perception: Attention and perception normal.         Mood and Affect: Mood is not anxious or depressed.         Speech: Speech normal.         Behavior: Behavior normal.         Thought Content: Thought content normal.         Judgment: Judgment normal.

## 2024-09-16 ENCOUNTER — OFFICE VISIT (OUTPATIENT)
Dept: SLEEP CENTER | Facility: CLINIC | Age: 34
End: 2024-09-16
Payer: COMMERCIAL

## 2024-09-16 VITALS
WEIGHT: 232 LBS | BODY MASS INDEX: 28.25 KG/M2 | SYSTOLIC BLOOD PRESSURE: 146 MMHG | HEIGHT: 76 IN | DIASTOLIC BLOOD PRESSURE: 85 MMHG | HEART RATE: 80 BPM

## 2024-09-16 DIAGNOSIS — G47.8 SLEEP PARALYSIS: ICD-10-CM

## 2024-09-16 DIAGNOSIS — G47.21 CIRCADIAN RHYTHM SLEEP DISORDER, DELAYED SLEEP PHASE TYPE: Primary | ICD-10-CM

## 2024-09-16 PROCEDURE — 99215 OFFICE O/P EST HI 40 MIN: CPT | Performed by: PSYCHIATRY & NEUROLOGY

## 2024-09-16 NOTE — PATIENT INSTRUCTIONS
Thank you for trusting me with your care!    I know we often  cover a lot of information at the visit, so if you have follow-up questions, are unclear about the plan, or feel there were important items that we did not discuss or you did not receive clarity on, please don't hesitate to reach out to me.     [employer] messages are preferred for routine matters.  Please make sure to call for urgent matters as there can be a delay in responding to questions over [employer].    Room darkening blind/shades     I would recommend trying the following to shift your sleep cycle earlier    1) I would recommend starting melatonin 0.3 mg (300 mcg) about 4 hours before your natural bedtime.  In your case, I would recommend taking that at 10 PM.  This dose is very hard to find, it is not typically sold in stores.  I know [employer] usually sells it    2) light therapy can be very helpful to also reprogram your sleep cycle.  I would avoid all bright lights and screens for approximately 1 hour before bed.  Try to go to bed at the same time each night.  I would then set an alarm approximately 9 hours later and try very hard to get out of bed.  As soon as you get out of bed, I would go outside and obtain half an hour of direct sunlight.  You can read outside, wear sunscreen if you need to.  Every few days, you can start to move your bedtime earlier in 15-minute increments and set the alarm 15 minutes earlier.  Getting direct sunlight every day is extremely important and necessary for this to work.  To start, I would recommend going to bed at 3 am and setting the alarm at 10 am    3) as part of shifting your sleep cycle, it is also important to keep the same schedule 7 days a week, meaning you should sleep in same schedule on weekends as you do on weekdays.      IMPORTANT- Prior to a sleep study (at home or in the sleep lab), I strongly recommend contacting your medical insurance first to understand your benefits (including deductible if  "applicable), coverage for this test, and out of pocket costs.  Even if the test is approved by medical insurance, the cost to you is determined by your medical benefits.  You can also use [employer] on [facility]'s website, under the drop down \"Patients and Visitors\".  If you have concerns about your out of pocket costs for sleep testing, please contact me/the office before you complete the test and we can discuss if there are alternate options.     I recommend following this advice in general before any lab test, imaging test, doctor visit, surgery, or ordering CPAP supplies as it is best to understand your coverage to avoid unexpected bills after the fact.       Nursing Support:  When: Monday through Friday 7:30A-4:30PM except holidays  Where: Our direct line is [phone]  *3  *1.      If you are having a true emergency please call 911.  In the event that the line is busy or it is after hours please leave a voice message and we will return your call.  Please speak clearly, leaving your full name, birth date, best number to reach you and the reason for your call.   Medication refills: We will need the name of the medication, the dosage, the ordering provider, whether you get a 30 or 90 day refill, and the pharmacy name and address.  Medications will be ordered by the provider only.  Nurses cannot call in prescriptions.  Please allow 7 days for medication refills.  Physician requested updates: If your provider requested that you call with an update after starting medication, please be ready to provide us the medication and dosage, what time you take your medication, the time you attempt to fall asleep, time you fall asleep, when you wake up, and what time you get out of bed.  Sleep Study Results: We will contact you with sleep study results and/or next steps after the physician has reviewed your testing.    "

## 2024-09-16 NOTE — PROGRESS NOTES
"Assessment/Plan:    1. Circadian rhythm sleep disorder, delayed sleep phase type  2. Sleep paralysis  -     Ambulatory Referral to Sleep Medicine    We discussed that he seems to have very natural delay in his circadian rhythm with her preference for delayed bedtime and late rise time.  His preference though is to switch to an earlier schedule, we discussed methods to advance the circadian rhythm in gradual increments using light therapy upon awakening as well as very low-dose (0.3 mg) melatonin prior to sleep.  We discussed that he likely has a natural inclination towards late bedtime so if he achieves an optimal sleep schedule, he will need to be very regimented to stay there, if he drips from his new schedule he will likely have a gradual progression back to his delayed sleep schedule    He is already scheduled for diagnostic polysomnogram to assess for obstructive sleep apnea.  Due to his delayed circadian rhythm, he will either need to sleep later into the morning or alternatively we should wait for the sleep study until his sleep schedule is sufficiently advanced.    Regarding sleep paralysis, no specific intervention is needed for this.  If this is very disruptive we can consider medications such as SSRIs or venlafaxine but he notes this has remitted with use of over-the-counter antihistamines    Lastly, he describes hypnic jerks, these we discussed are benign and do not require a treatment or intervention    Subjective:      Patient ID: Paulino Krishna is a 34 y.o. male.    HPI  This is a 34-year-old male who presents with a chief concern for obstructive sleep apnea.  He also identifies difficulty with snoring and issues with sleep.    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      He is a night owl by history, Feels exhausted all day but that vanishes at night.  He found it harder to fall asleep once starting bupropion     He aims to sleep by 2 am but often not asleep until 4 am.  Avoids getting in bed when he is not sleepy.  Once he tries to sleep , asleep 10-45 minutes.  Awakens several time during the night.  He is up at 10:30 AM on a workday , 11:30 AM when not working.      If on extended break, wakes 11 am - 12 pm    He sleeps about 6 hours a night. Feels he does ok with 7 hours of sleep     He is not  refreshed upon awakening, sometimes sleepy during the day.  He intentionally takes a nap a few days out of the week.  He does not fall asleep unintentionally.     He shares a bed at home and was told he has snoring and makes a popping sound out of sleep     He describes having had episodes of sleep paralysis in the past and symptoms of sleep elucidation.  Denies cataplexy.    He has not had sleepwalking or dream enactment.  He has had nightmares in the past.    Takes OTC antihistamines for sleep   Caffeine- strong glass of cold brew  Alcohol- less than once a month    Not clear RLS, has some feeling he has to move \"an OCD thing\"    His ideal schedule is 12-1 am and up 7-8 am     [place] Sleepiness Scale  Sitting and reading: Slight chance of dozing  Watching TV: Would never doze  Sitting, inactive in a public place (e.g. a theatre or a meeting): Would never doze  As a passenger in a car for an hour without a break: Would never doze  Lying down to rest in the afternoon when circumstances permit: Moderate chance of dozing  Sitting and talking to someone: Would never doze  Sitting quietly after a lunch without alcohol: Would never doze  In a car, while stopped for a few minutes in traffic: Would never doze  Total score: 3      Review of Systems  (as above unless " "noted)    Objective:      Visit Vitals  /85 (BP Location: Left arm, Patient Position: Sitting, Cuff Size: Large)   Pulse 80   Ht 6' 4\" (1.93 m)   Wt 105 kg (232 lb)   BMI 28.24 kg/m²   Smoking Status Never   BSA 2.36 m²             Physical Exam    Mallampati Grade : Mallampati 4  tongue : Large tongue  Small nares   Large neck size      I have spent a total time of 42 minutes in caring for this patient on the day of the visit/encounter including Diagnostic results, Instructions for management, Patient and family education, Importance of tx compliance, Risk factor reductions, Impressions, Counseling / Coordination of care, Documenting in the medical record, Reviewing / ordering tests, medicine, procedures  , and Obtaining or reviewing history  .   "

## 2024-10-07 ENCOUNTER — TELEPHONE (OUTPATIENT)
Age: 34
End: 2024-10-07

## 2024-10-28 DIAGNOSIS — F17.298 OTHER TOBACCO PRODUCT NICOTINE DEPENDENCE WITH OTHER NICOTINE-INDUCED DISORDER: ICD-10-CM

## 2024-10-28 DIAGNOSIS — L21.9 SEBORRHEIC DERMATITIS OF SCALP: ICD-10-CM

## 2024-10-28 DIAGNOSIS — J31.0 CHRONIC RHINITIS: ICD-10-CM

## 2024-10-29 RX ORDER — BUPROPION HYDROCHLORIDE 150 MG/1
150 TABLET, EXTENDED RELEASE ORAL 2 TIMES DAILY
Qty: 60 TABLET | Refills: 0 | Status: SHIPPED | OUTPATIENT
Start: 2024-10-29 | End: 2024-11-28

## 2024-10-29 RX ORDER — CLOBETASOL PROPIONATE 0.5 MG/ML
SOLUTION TOPICAL 2 TIMES DAILY
Qty: 50 ML | Refills: 0 | Status: SHIPPED | OUTPATIENT
Start: 2024-10-29

## 2024-10-29 RX ORDER — AZELASTINE 1 MG/ML
1 SPRAY, METERED NASAL 2 TIMES DAILY
Qty: 30 ML | Refills: 0 | Status: SHIPPED | OUTPATIENT
Start: 2024-10-29 | End: 2024-11-28

## 2024-12-02 DIAGNOSIS — F17.298 OTHER TOBACCO PRODUCT NICOTINE DEPENDENCE WITH OTHER NICOTINE-INDUCED DISORDER: ICD-10-CM

## 2024-12-02 DIAGNOSIS — J31.0 CHRONIC RHINITIS: ICD-10-CM

## 2024-12-03 RX ORDER — BUPROPION HYDROCHLORIDE 150 MG/1
150 TABLET, EXTENDED RELEASE ORAL 2 TIMES DAILY
Qty: 60 TABLET | Refills: 5 | Status: SHIPPED | OUTPATIENT
Start: 2024-12-03 | End: 2025-01-02

## 2024-12-03 RX ORDER — AZELASTINE 1 MG/ML
1 SPRAY, METERED NASAL 2 TIMES DAILY
Qty: 30 ML | Refills: 0 | Status: SHIPPED | OUTPATIENT
Start: 2024-12-03 | End: 2025-01-02

## 2024-12-18 ENCOUNTER — TELEPHONE (OUTPATIENT)
Age: 34
End: 2024-12-18

## 2024-12-18 NOTE — TELEPHONE ENCOUNTER
Patient on wait list for med mgmt services.  Called Pt to offer an appt. No answer, lvm for Pt to call back [facility] at [phone], opt 3.    1st attempt.

## 2024-12-26 NOTE — TELEPHONE ENCOUNTER
Contacted pt off Medication Management wait list in regards to verify needs of service and offer an appt. LVM for pt to contact intake dept for scheduling.        Second Attempt to schedule. Pt removed from wait list.

## 2025-01-10 ENCOUNTER — PATIENT MESSAGE (OUTPATIENT)
Dept: FAMILY MEDICINE CLINIC | Facility: CLINIC | Age: 35
End: 2025-01-10

## 2025-02-06 DIAGNOSIS — J31.0 CHRONIC RHINITIS: ICD-10-CM

## 2025-02-06 DIAGNOSIS — F17.298 OTHER TOBACCO PRODUCT NICOTINE DEPENDENCE WITH OTHER NICOTINE-INDUCED DISORDER: ICD-10-CM

## 2025-02-06 DIAGNOSIS — L21.9 SEBORRHEIC DERMATITIS OF SCALP: ICD-10-CM

## 2025-02-07 RX ORDER — AZELASTINE 1 MG/ML
1 SPRAY, METERED NASAL 2 TIMES DAILY
Qty: 30 ML | Refills: 0 | Status: SHIPPED | OUTPATIENT
Start: 2025-02-07 | End: 2025-03-09

## 2025-02-07 RX ORDER — CLOBETASOL PROPIONATE 0.5 MG/ML
SOLUTION TOPICAL 2 TIMES DAILY
Qty: 50 ML | Refills: 0 | Status: SHIPPED | OUTPATIENT
Start: 2025-02-07

## 2025-02-07 RX ORDER — KETOCONAZOLE 20 MG/ML
1 SHAMPOO, SUSPENSION TOPICAL 2 TIMES WEEKLY
Qty: 120 ML | Refills: 0 | Status: SHIPPED | OUTPATIENT
Start: 2025-02-10

## 2025-02-07 RX ORDER — BUPROPION HYDROCHLORIDE 150 MG/1
150 TABLET, EXTENDED RELEASE ORAL 2 TIMES DAILY
Qty: 60 TABLET | Refills: 5 | Status: SHIPPED | OUTPATIENT
Start: 2025-02-07 | End: 2025-08-06

## 2025-02-13 ENCOUNTER — HOSPITAL ENCOUNTER (OUTPATIENT)
Dept: SLEEP CENTER | Facility: CLINIC | Age: 35
Discharge: HOME | End: 2025-02-13
Payer: COMMERCIAL

## 2025-02-13 DIAGNOSIS — G47.19 EXCESSIVE DAYTIME SLEEPINESS: ICD-10-CM

## 2025-02-13 PROCEDURE — 95810 POLYSOM 6/> YRS 4/> PARAM: CPT

## 2025-02-14 NOTE — PROGRESS NOTES
Sleep Study Documentation    Pre-Sleep Study       Sleep testing procedure explained to patient:YES    Patient napped prior to study:NO    Caffeine:Dayshift worker after 12PM.  Caffeine use:YES- coffee  6 ounces    Alcohol:Dayshift workers after 5PM: Alcohol use:NO    Typical day for patient:YES       Study Documentation    Sleep Study Indications: EDS    Sleep Study: Diagnostic   Snore:None  Supplemental O2: no      Minimum SaO2 92  Baseline SaO2 95      EKG abnormalities: no     EEG abnormalities: no    Were abnormal behaviors in sleep observed:NO    Is Total Sleep Study Recording Time < 2 hours: N/A    Is Total Sleep Study Recording Time > 2 hours but study is incomplete: N/A    Is Total Sleep Study Recording Time 6 hours or more but sleep was not obtained: NO    Patient classification: employed       Post-Sleep Study    Medication used at bedtime or during sleep study:YES over the counter sleep aid    Patient reports time it took to fall asleep:20 to 30 minutes    Patient reports waking up during study:3 or more times.  Patient reports returning to sleep in 10 to 30 minutes.    Patient reports sleeping 2 to 4 hours without dreaming.    Does the Patient feel this is a typical night of sleep:worse than usual    Patient rated sleepiness: Not sleepy or tired    PAP treatment:no.

## 2025-02-26 ENCOUNTER — TELEPHONE (OUTPATIENT)
Dept: SLEEP CENTER | Facility: CLINIC | Age: 35
End: 2025-02-26

## 2025-02-26 NOTE — TELEPHONE ENCOUNTER
Diagnostic study resulted, does not support a diagnosis of MARK.  Reduced sleep efficiency noted.    Patient to follow-up with Dr. Mcmahon.    Call placed to patient, advised of above.    Follow-up appointment with Dr. Mcmahon 4/1/2025 in [place].  Added to the wait list for a sooner appointment in [place]

## 2025-03-11 DIAGNOSIS — J31.0 CHRONIC RHINITIS: ICD-10-CM

## 2025-03-11 RX ORDER — AZELASTINE 1 MG/ML
1 SPRAY, METERED NASAL 2 TIMES DAILY
Qty: 30 ML | Refills: 0 | Status: SHIPPED | OUTPATIENT
Start: 2025-03-11 | End: 2025-04-10

## 2025-03-24 ENCOUNTER — TELEPHONE (OUTPATIENT)
Age: 35
End: 2025-03-24

## 2025-03-24 NOTE — TELEPHONE ENCOUNTER
Need an insurance referral for Physician to Physician  Npi [id number] DX G47.33 G47.8 G47.21. This is not a sleep study. Appt 4/1

## 2025-04-01 ENCOUNTER — OFFICE VISIT (OUTPATIENT)
Dept: SLEEP CENTER | Facility: CLINIC | Age: 35
End: 2025-04-01
Payer: COMMERCIAL

## 2025-04-01 VITALS
SYSTOLIC BLOOD PRESSURE: 142 MMHG | BODY MASS INDEX: 29.83 KG/M2 | DIASTOLIC BLOOD PRESSURE: 78 MMHG | WEIGHT: 245 LBS | HEIGHT: 76 IN | HEART RATE: 72 BPM | OXYGEN SATURATION: 98 %

## 2025-04-01 DIAGNOSIS — F51.04 CHRONIC INSOMNIA: ICD-10-CM

## 2025-04-01 DIAGNOSIS — J32.9 CHRONIC SINUSITIS, UNSPECIFIED LOCATION: Primary | ICD-10-CM

## 2025-04-01 DIAGNOSIS — J34.2 DEVIATED NASAL SEPTUM: ICD-10-CM

## 2025-04-01 PROCEDURE — 99213 OFFICE O/P EST LOW 20 MIN: CPT | Performed by: INTERNAL MEDICINE

## 2025-04-01 RX ORDER — DOXEPIN HYDROCHLORIDE 10 MG/ML
6 SOLUTION ORAL
Qty: 54 ML | Refills: 2 | Status: SHIPPED | OUTPATIENT
Start: 2025-04-01 | End: 2025-04-06
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
## 2025-04-01 NOTE — PATIENT INSTRUCTIONS
Take melatonin 3 mg 2 hours before your desired bedtime  Start with 3 mg of liquid solution of doxepin and increase up to 6 mg  Provided 2 weeks sleep diary  Avoid nicotine after 4pm  Stop using benadryl and nasal spray

## 2025-04-02 DIAGNOSIS — F51.04 CHRONIC INSOMNIA: ICD-10-CM

## 2025-04-02 NOTE — TELEPHONE ENCOUNTER
Last office visit 4/1/2025  Next office visit 5/15/2025    Dr. Mcmahon,      Pharmacy requests RX bottle be 120 ml.    Please review RX requests and sign if agreeable.   Thank you.

## 2025-04-06 RX ORDER — DOXEPIN HYDROCHLORIDE 10 MG/ML
6 SOLUTION ORAL
Qty: 120 ML | Refills: 0 | Status: SHIPPED | OUTPATIENT
Start: 2025-04-06

## 2025-04-14 ENCOUNTER — TELEPHONE (OUTPATIENT)
Age: 35
End: 2025-04-14

## 2025-04-14 DIAGNOSIS — J31.0 CHRONIC RHINITIS: ICD-10-CM

## 2025-04-14 NOTE — TELEPHONE ENCOUNTER
PT called to schedule an appt - since [facility] is booked far out offered the [facility] number

## 2025-04-16 RX ORDER — AZELASTINE 1 MG/ML
1 SPRAY, METERED NASAL 2 TIMES DAILY
Qty: 30 ML | Refills: 1 | Status: SHIPPED | OUTPATIENT
Start: 2025-04-16 | End: 2025-11-02

## 2025-04-25 NOTE — TELEPHONE ENCOUNTER
Patient called in stating that he need an insurance referral for ENT appointment on 4/28 Please Advise

## 2025-05-01 ENCOUNTER — HOSPITAL ENCOUNTER (OUTPATIENT)
Dept: RADIOLOGY | Facility: HOSPITAL | Age: 35
Discharge: HOME | End: 2025-05-01
Payer: COMMERCIAL

## 2025-05-01 ENCOUNTER — TELEPHONE (OUTPATIENT)
Age: 35
End: 2025-05-01

## 2025-05-01 DIAGNOSIS — J34.2 DEVIATED SEPTUM: ICD-10-CM

## 2025-05-01 DIAGNOSIS — J34.3 NASAL TURBINATE HYPERTROPHY: ICD-10-CM

## 2025-05-01 DIAGNOSIS — J32.9 CHRONIC SINUSITIS, UNSPECIFIED LOCATION: ICD-10-CM

## 2025-05-01 PROCEDURE — 70486 CT MAXILLOFACIAL W/O DYE: CPT

## 2025-05-01 NOTE — TELEPHONE ENCOUNTER
Called and LVM for patient to confirm upcoming appt w Dr. Mcmahon. Provided patient w appt time, date and location

## 2025-06-28 DIAGNOSIS — F51.04 CHRONIC INSOMNIA: ICD-10-CM

## 2025-06-30 RX ORDER — DOXEPIN HYDROCHLORIDE 10 MG/ML
SOLUTION ORAL
Qty: 120 ML | Refills: 0 | Status: SHIPPED | OUTPATIENT
Start: 2025-06-30

## 2025-06-30 NOTE — TELEPHONE ENCOUNTER
Refill request for doxepin (SINEquan) 10 mg/mL solution received from pharmacy via interface.    Per chart, last Rx sent was on 4/6/2025 for a 200-day supply.  Refill not appropriate.  Too soon for refill.